# Patient Record
Sex: FEMALE
[De-identification: names, ages, dates, MRNs, and addresses within clinical notes are randomized per-mention and may not be internally consistent; named-entity substitution may affect disease eponyms.]

---

## 2023-07-20 ENCOUNTER — NURSE TRIAGE (OUTPATIENT)
Dept: OTHER | Facility: CLINIC | Age: 73
End: 2023-07-20

## 2023-07-20 NOTE — TELEPHONE ENCOUNTER
Location of patient: [place]    Subjective: Caller states \"I was just discharged from [facility] on the 19th and I have a couple of questions. I went in on the 17th and they put me on IV and they thought I had colitis or some kind of problem. Before I left they gave me a liquid diet. They never wrote it on any orders at home. \"     Advised patient to continue liquid diet as instructed until seen by her PCP for follow up appt. and advised on what that consists of including jello, pudding, water, fruit juice, Ensure, and soup broth; she verbalized understanding. Advised patient to call and make a follow up appointment with her PCP as instructed and she states she will call now. Recommended disposition: [facility] advice provided, patient verbalizes understanding; denies any other questions or concerns. Outcome: Patient/caller agrees to follow-up with PCP     This triage is a result of a call to the [facility]    Reason for Disposition   Health Information question, no triage required and triager able to answer question    Protocols used:  Information Only Call - No Triage-ADULT-

## 2023-07-21 ENCOUNTER — NURSE TRIAGE (OUTPATIENT)
Dept: OTHER | Facility: CLINIC | Age: 73
End: 2023-07-21